# Patient Record
Sex: MALE | Race: WHITE | NOT HISPANIC OR LATINO | URBAN - METROPOLITAN AREA
[De-identification: names, ages, dates, MRNs, and addresses within clinical notes are randomized per-mention and may not be internally consistent; named-entity substitution may affect disease eponyms.]

---

## 2017-02-28 ENCOUNTER — OFFICE (OUTPATIENT)
Dept: URBAN - METROPOLITAN AREA CLINIC 11 | Facility: CLINIC | Age: 27
End: 2017-02-28

## 2017-02-28 VITALS
SYSTOLIC BLOOD PRESSURE: 137 MMHG | HEART RATE: 59 BPM | DIASTOLIC BLOOD PRESSURE: 8 MMHG | WEIGHT: 136 LBS | HEIGHT: 68 IN | RESPIRATION RATE: 16 BRPM

## 2017-02-28 DIAGNOSIS — K30 FUNCTIONAL DYSPEPSIA: ICD-10-CM

## 2017-02-28 PROCEDURE — 99213 OFFICE O/P EST LOW 20 MIN: CPT | Performed by: INTERNAL MEDICINE

## 2017-02-28 RX ORDER — RANITIDINE HYDROCHLORIDE 300 MG/1
TABLET, FILM COATED ORAL
Qty: 30 | Refills: 3 | Status: COMPLETED
Start: 2017-02-28 | End: 2018-01-04

## 2017-02-28 RX ORDER — SCOPOLAMINE 1 MG/3D
PATCH, EXTENDED RELEASE TRANSDERMAL
Qty: 7 | Refills: 0 | Status: COMPLETED
Start: 2017-02-28 | End: 2017-05-30

## 2017-02-28 NOTE — SERVICEHPINOTES
"For the most part, I've been fine."  He has been having some issues with "indigestion, burning" with reflux and some random issues with nausea in the mornings and random abdomdinal pain in various locations.  The symptoms of the pain and nausea comes and goes.  He has tried the Levsin but it did not seem to help him much.  He has been off of his reflux meds.  He has had some issues with spicy foods and [employer].He has a history of eosinophilic esophagitis and mastocytic colitis.   He has been off of his Lexapro for quite some tiem.

## 2017-02-28 NOTE — SERVICENOTES
We discussed his current and prior issues.  I would like to restart his Zantac which has worked in the past and have him try the FDgard for the dyspepsia. He has needed a script for sea sickness for an upcoming cruise.

## 2017-05-30 ENCOUNTER — OFFICE (OUTPATIENT)
Dept: URBAN - METROPOLITAN AREA CLINIC 11 | Facility: CLINIC | Age: 27
End: 2017-05-30

## 2017-05-30 VITALS
WEIGHT: 137 LBS | HEIGHT: 68 IN | SYSTOLIC BLOOD PRESSURE: 122 MMHG | HEART RATE: 59 BPM | DIASTOLIC BLOOD PRESSURE: 70 MMHG

## 2017-05-30 DIAGNOSIS — R13.19 OTHER DYSPHAGIA: ICD-10-CM

## 2017-05-30 DIAGNOSIS — K21.9 GASTRO-ESOPHAGEAL REFLUX DISEASE WITHOUT ESOPHAGITIS: ICD-10-CM

## 2017-05-30 PROCEDURE — 99213 OFFICE O/P EST LOW 20 MIN: CPT | Performed by: INTERNAL MEDICINE

## 2017-05-30 RX ORDER — LEVOCETIRIZINE DIHYDROCHLORIDE 5 MG/1
TABLET ORAL
Qty: 30 | Refills: 3 | Status: COMPLETED
Start: 2017-05-30 | End: 2017-07-03

## 2017-05-30 NOTE — SERVICENOTES
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  He will need an EGD for eval.  With his allergic issues causing difficulties with his IBS, I will add Xyzal which may help with the esophagitis as well.

## 2017-05-30 NOTE — SERVICEHPINOTES
He stated that he has been doing much better.  He has noted that dust from sweeping can cause issues with his IBS and allergies.  He has also noted that drinking Coke in the morning will cause difficulties with reflux.  He has not been on allergy meds in quite some time.  He has been eating well but has not been gaining weight. He has a history of Clayton's and also eosinophilic esophagitis.  He has had some intermittent issues recently with dysphagia to solid foods such as hamburgers and meats.  He has not had pyrosis for the most part.

## 2017-06-29 ENCOUNTER — AMBULATORY SURGICAL CENTER (OUTPATIENT)
Dept: URBAN - METROPOLITAN AREA SURGERY 3 | Facility: SURGERY | Age: 27
End: 2017-06-29

## 2017-06-29 ENCOUNTER — OFFICE (OUTPATIENT)
Dept: URBAN - METROPOLITAN AREA CLINIC 11 | Facility: CLINIC | Age: 27
End: 2017-06-29

## 2017-06-29 ENCOUNTER — AMBULATORY SURGICAL CENTER (OUTPATIENT)
Dept: URBAN - METROPOLITAN AREA SURGERY 3 | Facility: SURGERY | Age: 27
End: 2017-06-29
Payer: COMMERCIAL

## 2017-06-29 ENCOUNTER — OFFICE (OUTPATIENT)
Dept: URBAN - METROPOLITAN AREA CLINIC 11 | Facility: CLINIC | Age: 27
End: 2017-06-29
Payer: COMMERCIAL

## 2017-06-29 VITALS
RESPIRATION RATE: 17 BRPM | DIASTOLIC BLOOD PRESSURE: 66 MMHG | SYSTOLIC BLOOD PRESSURE: 100 MMHG | HEIGHT: 68 IN | SYSTOLIC BLOOD PRESSURE: 116 MMHG | DIASTOLIC BLOOD PRESSURE: 73 MMHG | HEART RATE: 82 BPM | SYSTOLIC BLOOD PRESSURE: 119 MMHG | DIASTOLIC BLOOD PRESSURE: 73 MMHG | TEMPERATURE: 97.4 F | OXYGEN SATURATION: 99 % | DIASTOLIC BLOOD PRESSURE: 66 MMHG | OXYGEN SATURATION: 99 % | WEIGHT: 140 LBS | OXYGEN SATURATION: 100 % | OXYGEN SATURATION: 98 % | SYSTOLIC BLOOD PRESSURE: 114 MMHG | RESPIRATION RATE: 16 BRPM | HEART RATE: 90 BPM | SYSTOLIC BLOOD PRESSURE: 112 MMHG | RESPIRATION RATE: 14 BRPM | RESPIRATION RATE: 17 BRPM | HEIGHT: 68 IN | SYSTOLIC BLOOD PRESSURE: 116 MMHG | DIASTOLIC BLOOD PRESSURE: 63 MMHG | DIASTOLIC BLOOD PRESSURE: 77 MMHG | DIASTOLIC BLOOD PRESSURE: 63 MMHG | OXYGEN SATURATION: 98 % | RESPIRATION RATE: 16 BRPM | HEART RATE: 82 BPM | RESPIRATION RATE: 14 BRPM | HEART RATE: 110 BPM | DIASTOLIC BLOOD PRESSURE: 77 MMHG | HEART RATE: 90 BPM | TEMPERATURE: 97.4 F | HEART RATE: 110 BPM | DIASTOLIC BLOOD PRESSURE: 82 MMHG | HEART RATE: 97 BPM | SYSTOLIC BLOOD PRESSURE: 100 MMHG | DIASTOLIC BLOOD PRESSURE: 82 MMHG | OXYGEN SATURATION: 100 % | SYSTOLIC BLOOD PRESSURE: 119 MMHG | HEART RATE: 97 BPM | WEIGHT: 140 LBS | SYSTOLIC BLOOD PRESSURE: 114 MMHG | SYSTOLIC BLOOD PRESSURE: 112 MMHG

## 2017-06-29 DIAGNOSIS — K21.0 GASTRO-ESOPHAGEAL REFLUX DISEASE WITH ESOPHAGITIS: ICD-10-CM

## 2017-06-29 DIAGNOSIS — R13.19 OTHER DYSPHAGIA: ICD-10-CM

## 2017-06-29 DIAGNOSIS — K22.2 ESOPHAGEAL OBSTRUCTION: ICD-10-CM

## 2017-06-29 DIAGNOSIS — R93.3 ABNORMAL FINDINGS ON DIAGNOSTIC IMAGING OF OTHER PARTS OF DI: ICD-10-CM

## 2017-06-29 PROBLEM — K25.9 GASTRIC ULCER, UNSP AS ACUTE OR CHRONIC, W/O HEMOR OR PERF: Status: ACTIVE | Noted: 2017-06-29

## 2017-06-29 PROCEDURE — 88313 SPECIAL STAINS GROUP 2: CPT | Performed by: INTERNAL MEDICINE

## 2017-06-29 PROCEDURE — 43239 EGD BIOPSY SINGLE/MULTIPLE: CPT | Performed by: INTERNAL MEDICINE

## 2017-06-29 PROCEDURE — 88342 IMHCHEM/IMCYTCHM 1ST ANTB: CPT | Performed by: INTERNAL MEDICINE

## 2017-06-29 PROCEDURE — 43450 DILATE ESOPHAGUS 1/MULT PASS: CPT | Performed by: INTERNAL MEDICINE

## 2017-06-29 PROCEDURE — 88305 TISSUE EXAM BY PATHOLOGIST: CPT | Performed by: INTERNAL MEDICINE

## 2017-06-29 PROCEDURE — 88312 SPECIAL STAINS GROUP 1: CPT | Performed by: INTERNAL MEDICINE

## 2017-07-03 ENCOUNTER — OFFICE (OUTPATIENT)
Dept: URBAN - METROPOLITAN AREA CLINIC 11 | Facility: CLINIC | Age: 27
End: 2017-07-03

## 2017-07-03 VITALS
HEIGHT: 67 IN | HEART RATE: 97 BPM | SYSTOLIC BLOOD PRESSURE: 126 MMHG | WEIGHT: 128 LBS | DIASTOLIC BLOOD PRESSURE: 90 MMHG

## 2017-07-03 DIAGNOSIS — K22.2 ESOPHAGEAL OBSTRUCTION: ICD-10-CM

## 2017-07-03 DIAGNOSIS — K20.0 EOSINOPHILIC ESOPHAGITIS: ICD-10-CM

## 2017-07-03 PROCEDURE — 99213 OFFICE O/P EST LOW 20 MIN: CPT | Performed by: INTERNAL MEDICINE

## 2017-07-03 RX ORDER — LEVOCETIRIZINE DIHYDROCHLORIDE 5 MG/1
TABLET ORAL
Qty: 30 | Refills: 3 | Status: COMPLETED
Start: 2017-05-30 | End: 2017-07-03

## 2017-07-03 NOTE — SERVICEHPINOTES
He stated that after he left here after his EGD that he went home in his truck "[employer]" back to [place].  He felt tired and did eat at home, a [facility] bowl.  Afterward, he stated that he did not feel right and "passed out at home".  His wife was present.  He was seen by the EMT and was told that his heart rate ran in the 70s to 100s.  He was seen in [place] and was told that he was dehydrated.  He was given IVF and felt better quickly.  He has not had futher issues.  He has not had chest pain, dysphagia, reflux or other issues.  He stated that he as doing well and was able to eat a spicy chicken sandwhich. He has not started the Xyzal and has been on the Zantac.

## 2017-07-03 NOTE — SERVICENOTES
We discussed his near sycopal/syncopal episode last week and it would seem that he may have been dehydrated and overheated at the time.  He has not had further issues.  We discussed his EGD results and the resolution of his dysphagia after the dilation.  I will have him not start the Xyal with the negative bx and continue the Zantac.

## 2018-01-04 ENCOUNTER — OFFICE (OUTPATIENT)
Dept: URBAN - METROPOLITAN AREA CLINIC 11 | Facility: CLINIC | Age: 28
End: 2018-01-04

## 2018-01-04 VITALS
DIASTOLIC BLOOD PRESSURE: 70 MMHG | HEIGHT: 67 IN | WEIGHT: 159 LBS | SYSTOLIC BLOOD PRESSURE: 128 MMHG | HEART RATE: 81 BPM

## 2018-01-04 DIAGNOSIS — K20.0 EOSINOPHILIC ESOPHAGITIS: ICD-10-CM

## 2018-01-04 PROCEDURE — 99213 OFFICE O/P EST LOW 20 MIN: CPT | Performed by: INTERNAL MEDICINE

## 2018-01-04 NOTE — SERVICEHPINOTES
He has been eating much better and has gain about 30lbs.  He has had some indigestion at times, depending on his diet.  He has not had issues with furhter syncopal issues since this summer. He has not had issues with dysphagia. He is currently off all of his meds.  He has noted that his anxiety has been an issue at times and with he he has had a soreness in his mid chest with pressure to the chest. His last esophageal bx were negative for eosinophilic esophagitis.

## 2018-01-04 NOTE — SERVICENOTES
He has not had further issues with his reflux or with the eosinophilic esophagitis.  He did have Clayton's mentioned on a bx in 2013 but does not have Clayton's.  This was likely a sampling issue at the GE junction.  He has not had futher issues with mastocytic colitis, which has clinically resolved.  I would see him back as needed.